# Patient Record
Sex: MALE
[De-identification: names, ages, dates, MRNs, and addresses within clinical notes are randomized per-mention and may not be internally consistent; named-entity substitution may affect disease eponyms.]

---

## 2018-03-28 ENCOUNTER — HOSPITAL ENCOUNTER (OUTPATIENT)
Dept: HOSPITAL 88 - MRI | Age: 48
End: 2018-03-28
Attending: SPECIALIST
Payer: COMMERCIAL

## 2018-03-28 DIAGNOSIS — S83.241A: Primary | ICD-10-CM

## 2018-03-28 NOTE — DIAGNOSTIC IMAGING REPORT
TECHNIQUE:

Magnetic resonance imaging of the RIGHT KNEE was performed WITHOUT injected

contrast. 



HISTORY:  Medial meniscal tear, running, twisted, one and half years

COMPARISON:  None available.



FINDINGS: 

LIGAMENTS AND TENDONS:

ACL:  Intact

PCL:  Intact

Collateral ligaments:  Intact

Iliotibial band:  Unremarkable

Popliteal tendon:  Intact

Extensor mechanism:  Intact



JOINT:

Menisci: 

Medial:  Complex tearing of the body and posterior horn.

Lateral:  Mild discoid variant. Mild attenuation and fraying of the free margin

of the posterior horn.



Articular Cartilage:

Medial Compartment:  Low to intermediate grade erosion and fibrillation.

Lateral Compartment:  No focal defect.

Patellofemoral Compartment:  No focal defect.



Joint Fluid: 

The amount of fluid within the joint is within physiologic limits.



BONES:  

No focal or infiltrative bone marrow replacing abnormality.  

No acute fracture.

Mild peripheral subchondral bone marrow edema of the medial tibial plateau and

medial femoral condyle.



SOFT TISSUES:

Otherwise, unremarkable.





IMPRESSION:

1.  Mild degenerative changes of the medial compartment, including degenerative

tearing of the medial meniscus along with mild peripheral reactive subchondral

bone marrow edema of the medial tibial plateau and medial femoral condyle.

2.  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

tearing of the free margin of the posterior horn.



Signed by: Dr. Huber Yoder D.O., M.M.M. on 3/28/2018 12:27 PM

## 2018-04-11 ENCOUNTER — HOSPITAL ENCOUNTER (OUTPATIENT)
Dept: HOSPITAL 88 - OR | Age: 48
Discharge: HOME | End: 2018-04-11
Attending: SPECIALIST
Payer: COMMERCIAL

## 2018-04-11 DIAGNOSIS — M17.11: ICD-10-CM

## 2018-04-11 DIAGNOSIS — M22.41: ICD-10-CM

## 2018-04-11 DIAGNOSIS — F17.210: ICD-10-CM

## 2018-04-11 DIAGNOSIS — S83.221A: Primary | ICD-10-CM
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
## 2018-04-11 DIAGNOSIS — Z01.810: ICD-10-CM

## 2018-04-11 PROCEDURE — 29881 ARTHRS KNE SRG MNISECTMY M/L: CPT

## 2018-04-11 PROCEDURE — 93005 ELECTROCARDIOGRAM TRACING: CPT

## 2018-04-11 NOTE — XMS REPORT
Patient Summary Document

 Created on: 2018



STEPHANIE OLIVIER

External Reference #: [id number]

: 1970

Sex: Male



Demographics







 Address  [address] [place] [id number]

 

 Home Phone  [phone]

 

 Preferred Language  Unknown

 

 Marital Status  Unknown

 

 Gnosticist Affiliation  Unknown

 

 Race  Unknown

 

 Additional Race(s)  

 

 Ethnic Group  Unknown





Author







 Author  [facility]

 

 [facility]

 

 Address  Unknown

 

 Phone  Unavailable







Care Team Providers







 Care Team Member Name  Role  Phone

 

 JOSH ALEXANDER  Unavailable  Unavailable







Problems

This patient has no known problems.



Allergies, Adverse Reactions, Alerts

This patient has no known allergies or adverse reactions.



Medications

This patient has no known medications.



Results







 Test Description  Test Time  Test Comments  Text Results  Atomic Results  
Result Comments









 MRI RIGHT KNEE WO            [place] [address]      Patient Name: STEPHANIE OLIVIER   MR #
: [id number]    : 1970 Age/Sex: 48/M  Acct #: [id number] Req #: [id number]
[id number]  Adm Physician:     Ordered by: JOSH ALEXANDER MD  Report #: [id number]
[id number]   Location: MRI  Room/Bed:     ____________________________________________
_______________________________________________________    Procedure: [id number] 
MRI/MRI RIGHT KNEE WO  Exam Date: 18                            Exam Time
: 1030       REPORT STATUS: Signed    TECHNIQUE:   Magnetic resonance imaging 
of the RIGHT KNEE was performed WITHOUT injected   contrast.       HISTORY:  
Medial meniscal tear, running, twisted, one and half years   COMPARISON:  None 
available.      FINDINGS:    LIGAMENTS AND TENDONS:   ACL:  Intact   PCL:  
Intact   Collateral ligaments:  Intact   Iliotibial band:  Unremarkable   
Popliteal tendon:  Intact   Extensor mechanism:  Intact      JOINT:   Menisci: 
   Medial:  Complex tearing of the body and posterior horn.   Lateral:  Mild 
discoid variant. Mild attenuation and fraying of the free margin   of the 
posterior horn.      Articular Cartilage:   Medial Compartment:  Low to 
intermediate grade erosion and fibrillation.   Lateral Compartment:  No focal 
defect.   Patellofemoral Compartment:  No focal defect.      Joint Fluid:    
The amount of fluid within the joint is within physiologic limits.      BONES: 
    No focal or infiltrative bone marrow replacing abnormality.     No acute 
fracture.   Mild peripheral subchondral bone marrow edema of the medial tibial 
plateau and   medial femoral condyle.      SOFT TISSUES:   Otherwise, 
unremarkable.         IMPRESSION:   1.  Mild degenerative changes of the medial 
compartment, including degenerative   tearing of the medial meniscus along with 
mild peripheral reactive subchondral   bone marrow edema of the medial tibial 
plateau and medial femoral condyle.   2.  Mild discoid variant of the lateral 
meniscus with mild focal degenerative   tearing of the free margin of the 
posterior horn.      Signed by: Dr. Russell Yoder D.O., M.M.M. on 3/28/2018 12:
27 PM        Dictated By: RUSSELL YODER DO  Electronically Signed By: RUSSELL YODER DO on 18 1227  Transcribed By: DONATO on 187       COPY TO:   
JOSH ALEXANDER MD

## 2020-11-03 NOTE — OPERATIVE REPORT
DATE OF PROCEDURE:  April 11, 2018 

 

PREOPERATIVE DIAGNOSES

1.  Right knee medial meniscus tear. 

2.  Right knee degenerative joint disease of the knee.



POSTOPERATIVE DIAGNOSES

1.  Right knee medial meniscus tear. 

2.  Right knee degenerative joint disease of the knee.



PROCEDURES PERFORMED:  Patient underwent.

1.  Right knee examination under anesthesia.

2.  Right knee arthroscopy. 

3.  Right knee partial medial meniscectomy. 

4.  Right knee chondroplasty of the patella, trochlea, medial femoral 

condyle, medial tibial plateau and lateral tibial plateau. 



ASSISTANT:  None.



ANESTHESIA:  General endotracheal intubation anesthesia.



IV FLUIDS:  As per the anesthesia record. 



BRIEF DESCRIPTION OF THE OPERATIVE PROCEDURE:  Ms. Murphy was taken to the 

operating room, placed in supine position on the operating table.  

Following induction of general anesthesia as well as endotracheal 

intubation, the patient's right lower extremity was examined under 

anesthesia.  He was found to have mild effusion with knee joint as well a 

ligamentously stable knee.  The patient's lower extremity was prepped and 

draped in standard surgical fashion.  A 2-portal technique was used to 

provide this patient arthroscopic evaluation of the knee joint.  

Examination of the suprapatellar pouch, medial and lateral gutters found no 

evidence of loose bodies.  There was, however, evidence of chondromalacia 

of the patellar and trochlear surfaces.  The scope was then advanced to the 

medial compartment.  Examination of the medial compartment demonstrated 

torn and macerated medial meniscus.  There was also chondromalacia of the 

articulating surfaces.  A combination of biting forceps and a motorized 

shaver were used to resect the torn portion of the meniscus.  

Chondroplasties of the medial femoral condyle, medial plateau were 

performed at this time.  The scope was then advanced to the intercondylar 

notch.  The anterior cruciate ligament was found to be intact.  Scope was 

then advanced to the lateral compartment.  Examination of lateral 

compartment demonstrated chondromalacia of the lateral tibial plateau.  

Chondroplasty of the surface was undertaken.  Scope was then advanced to 

the suprapatellar pouch and chondroplasty of the patella and trochlea 

performed.  The knee was deflated with sterile normal saline.  Each of the 

portal sites were closed using 4-0 nylon suture.  Portal sites as well as 

knee itself were injected with 0.5% Marcaine with epinephrine.  Sterile 

dressings were applied.  The patient was awakened, taken to postanesthesia 

care unit in stable condition. 









DD:  04/11/2018 19:49

DT:  04/11/2018 22:34

Job#:  [id number] GE Statement Selected